# Patient Record
Sex: FEMALE | URBAN - METROPOLITAN AREA
[De-identification: names, ages, dates, MRNs, and addresses within clinical notes are randomized per-mention and may not be internally consistent; named-entity substitution may affect disease eponyms.]

---

## 2019-02-20 ENCOUNTER — IMPORTED ENCOUNTER (OUTPATIENT)
Dept: URBAN - METROPOLITAN AREA CLINIC 31 | Facility: CLINIC | Age: 49
End: 2019-02-20

## 2019-02-20 PROBLEM — H04.123: Noted: 2019-02-20

## 2019-02-20 PROCEDURE — 92004 COMPRE OPH EXAM NEW PT 1/>: CPT

## 2019-02-20 PROCEDURE — 92310 CONTACT LENS FITTING OU: CPT

## 2019-02-20 NOTE — PATIENT DISCUSSION
Refractive error - Discussed limitations of mono and MFs. Order trials. To be seen when all sets in.

## 2019-03-12 ENCOUNTER — IMPORTED ENCOUNTER (OUTPATIENT)
Dept: URBAN - METROPOLITAN AREA CLINIC 31 | Facility: CLINIC | Age: 49
End: 2019-03-12

## 2019-03-12 NOTE — PATIENT DISCUSSION
1.  Return for an appointment in 1 year for comprehensive exam. with Dr. Elmira Orellana. 2.  Good fit and VA with both Clariti 1 Day MFs and Ultra MFs. Will try both at home and can order preferred.

## 2019-03-12 NOTE — PATIENT DISCUSSION
Good fit and VA with both Clariti 1 Day MFs and Ultra MFs. Will try both at home and can order preferred.

## 2022-04-02 ASSESSMENT — TONOMETRY
OS_IOP_MMHG: 14
OD_IOP_MMHG: 14

## 2022-04-02 ASSESSMENT — VISUAL ACUITY
OD_SC: 20/25+2
OS_SC: 20/50-2